# Patient Record
Sex: FEMALE | Race: WHITE | NOT HISPANIC OR LATINO
[De-identification: names, ages, dates, MRNs, and addresses within clinical notes are randomized per-mention and may not be internally consistent; named-entity substitution may affect disease eponyms.]

---

## 2021-11-15 PROBLEM — Z00.00 ENCOUNTER FOR PREVENTIVE HEALTH EXAMINATION: Status: ACTIVE | Noted: 2021-11-15

## 2021-11-16 ENCOUNTER — APPOINTMENT (OUTPATIENT)
Dept: HOME HEALTH SERVICES | Facility: HOME HEALTH | Age: 86
End: 2021-11-16
Payer: MEDICARE

## 2021-11-16 VITALS
TEMPERATURE: 98.3 F | SYSTOLIC BLOOD PRESSURE: 138 MMHG | OXYGEN SATURATION: 97 % | HEART RATE: 82 BPM | DIASTOLIC BLOOD PRESSURE: 66 MMHG | RESPIRATION RATE: 18 BRPM

## 2021-11-16 PROCEDURE — 90686 IIV4 VACC NO PRSV 0.5 ML IM: CPT

## 2021-11-16 PROCEDURE — G0008: CPT

## 2021-11-16 PROCEDURE — 99344 HOME/RES VST NEW MOD MDM 60: CPT

## 2021-11-16 PROCEDURE — 99497 ADVNCD CARE PLAN 30 MIN: CPT

## 2021-11-16 RX ORDER — OMEGA-3/DHA/EPA/FISH OIL 35-113.5MG
1000 TABLET,CHEWABLE ORAL DAILY
Qty: 30 | Refills: 4 | Status: ACTIVE | COMMUNITY
Start: 2021-11-16

## 2021-12-01 NOTE — HEALTH RISK ASSESSMENT
[HRA Reviewed] : Health risk assessment reviewed [No falls in past year] : Patient reported no falls in the past year [No] : The patient does not have visual impairment [[employer]] : n/a

## 2021-12-01 NOTE — ASSESSMENT
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

## 2021-12-01 NOTE — HISTORY OF PRESENT ILLNESS
[Family Member] : family member [FreeTextEntry1] : Gait instability [FreeTextEntry2] : Patient being seen for initial House Calls visit patient lives with 24/7 HHA.  Patient is non-verbal.  Was on medications for dementia but son states that they have not helped and is asking if she can cut back on medications.

## 2021-12-01 NOTE — PHYSICAL EXAM
[No Acute Distress] : no acute distress [No Respiratory Distress] : no respiratory distress [Clear to Auscultation] : lungs were clear to auscultation bilaterally [Normal Rate] : heart rate was normal  [Regular Rhythm] : with a regular rhythm [Normal Bowel Sounds] : normal bowel sounds [Non Tender] : non-tender [Soft] : abdomen soft [de-identified] : Stage 2 pressure ulcer to sacrum and left heel with unstageable ulcer.

## 2021-12-06 ENCOUNTER — APPOINTMENT (OUTPATIENT)
Dept: HOME HEALTH SERVICES | Facility: HOME HEALTH | Age: 86
End: 2021-12-06

## 2021-12-06 VITALS
TEMPERATURE: 97.6 F | DIASTOLIC BLOOD PRESSURE: 68 MMHG | SYSTOLIC BLOOD PRESSURE: 110 MMHG | HEART RATE: 78 BPM | RESPIRATION RATE: 18 BRPM | OXYGEN SATURATION: 94 %

## 2022-01-01 ENCOUNTER — APPOINTMENT (OUTPATIENT)
Dept: HOME HEALTH SERVICES | Facility: HOME HEALTH | Age: 87
End: 2022-01-01

## 2022-01-01 ENCOUNTER — NON-APPOINTMENT (OUTPATIENT)
Age: 87
End: 2022-01-01

## 2022-01-01 VITALS
TEMPERATURE: 98.3 F | HEART RATE: 75 BPM | DIASTOLIC BLOOD PRESSURE: 78 MMHG | SYSTOLIC BLOOD PRESSURE: 138 MMHG | RESPIRATION RATE: 16 BRPM | OXYGEN SATURATION: 95 %

## 2022-01-01 VITALS — OXYGEN SATURATION: 98 % | DIASTOLIC BLOOD PRESSURE: 60 MMHG | SYSTOLIC BLOOD PRESSURE: 117 MMHG | HEART RATE: 68 BPM

## 2022-01-01 VITALS
RESPIRATION RATE: 16 BRPM | DIASTOLIC BLOOD PRESSURE: 62 MMHG | HEART RATE: 68 BPM | SYSTOLIC BLOOD PRESSURE: 148 MMHG | TEMPERATURE: 98.5 F | OXYGEN SATURATION: 98 %

## 2022-01-01 VITALS
HEART RATE: 66 BPM | DIASTOLIC BLOOD PRESSURE: 62 MMHG | TEMPERATURE: 97.6 F | RESPIRATION RATE: 16 BRPM | OXYGEN SATURATION: 95 % | SYSTOLIC BLOOD PRESSURE: 138 MMHG

## 2022-01-01 VITALS — DIASTOLIC BLOOD PRESSURE: 60 MMHG | OXYGEN SATURATION: 98 % | SYSTOLIC BLOOD PRESSURE: 150 MMHG | HEART RATE: 86 BPM

## 2022-01-01 VITALS — HEART RATE: 80 BPM | SYSTOLIC BLOOD PRESSURE: 130 MMHG | DIASTOLIC BLOOD PRESSURE: 60 MMHG | OXYGEN SATURATION: 97 %

## 2022-01-01 DIAGNOSIS — I95.1 ORTHOSTATIC HYPOTENSION: ICD-10-CM

## 2022-01-01 PROCEDURE — 99349 HOME/RES VST EST MOD MDM 40: CPT

## 2022-01-01 PROCEDURE — G0008: CPT

## 2022-01-01 PROCEDURE — 90662 IIV NO PRSV INCREASED AG IM: CPT

## 2022-01-04 ENCOUNTER — TRANSCRIPTION ENCOUNTER (OUTPATIENT)
Age: 87
End: 2022-01-04

## 2022-01-04 ENCOUNTER — APPOINTMENT (OUTPATIENT)
Dept: HOME HEALTH SERVICES | Facility: HOME HEALTH | Age: 87
End: 2022-01-04
Payer: MEDICARE

## 2022-01-04 VITALS — DIASTOLIC BLOOD PRESSURE: 80 MMHG | HEART RATE: 74 BPM | SYSTOLIC BLOOD PRESSURE: 150 MMHG | OXYGEN SATURATION: 94 %

## 2022-01-04 DIAGNOSIS — L89.152 PRESSURE ULCER OF SACRAL REGION, STAGE 2: ICD-10-CM

## 2022-01-04 DIAGNOSIS — L89.620 PRESSURE ULCER OF LEFT HEEL, UNSTAGEABLE: ICD-10-CM

## 2022-01-04 PROCEDURE — 99349 HOME/RES VST EST MOD MDM 40: CPT

## 2022-01-04 NOTE — PHYSICAL EXAM
[No Acute Distress] : no acute distress [No Respiratory Distress] : no respiratory distress [Clear to Auscultation] : lungs were clear to auscultation bilaterally [Normal Rate] : heart rate was normal  [Regular Rhythm] : with a regular rhythm [Normal Bowel Sounds] : normal bowel sounds [Non Tender] : non-tender [Soft] : abdomen soft [No Skin Lesions] : no skin lesions

## 2022-01-04 NOTE — HISTORY OF PRESENT ILLNESS
[Family Member] : family member [FreeTextEntry1] : Gait instability [FreeTextEntry2] : Patient being seen for follow up House Calls visit patient lives with 24/7 HHA.  Patient is non-verbal.  Was on medications for dementia but son states that they have not helped and is asking if she can cut back on medications.  \par HHA states she is eating well, skin is healed.

## 2022-01-18 ENCOUNTER — APPOINTMENT (OUTPATIENT)
Dept: HOME HEALTH SERVICES | Facility: HOME HEALTH | Age: 87
End: 2022-01-18

## 2022-01-18 VITALS
RESPIRATION RATE: 16 BRPM | TEMPERATURE: 99 F | SYSTOLIC BLOOD PRESSURE: 150 MMHG | HEART RATE: 68 BPM | DIASTOLIC BLOOD PRESSURE: 68 MMHG

## 2022-03-03 ENCOUNTER — APPOINTMENT (OUTPATIENT)
Dept: HOME HEALTH SERVICES | Facility: HOME HEALTH | Age: 87
End: 2022-03-03

## 2022-03-03 VITALS
SYSTOLIC BLOOD PRESSURE: 140 MMHG | OXYGEN SATURATION: 97 % | DIASTOLIC BLOOD PRESSURE: 82 MMHG | HEART RATE: 73 BPM | TEMPERATURE: 98.3 F | RESPIRATION RATE: 16 BRPM

## 2022-03-07 ENCOUNTER — APPOINTMENT (OUTPATIENT)
Dept: HOME HEALTH SERVICES | Facility: HOME HEALTH | Age: 87
End: 2022-03-07

## 2022-04-21 ENCOUNTER — APPOINTMENT (OUTPATIENT)
Dept: HOME HEALTH SERVICES | Facility: HOME HEALTH | Age: 87
End: 2022-04-21

## 2022-04-21 VITALS
RESPIRATION RATE: 16 BRPM | SYSTOLIC BLOOD PRESSURE: 136 MMHG | DIASTOLIC BLOOD PRESSURE: 64 MMHG | OXYGEN SATURATION: 98 % | HEART RATE: 71 BPM

## 2022-05-02 RX ORDER — ESCITALOPRAM OXALATE 10 MG/1
10 TABLET ORAL DAILY
Qty: 90 | Refills: 3 | Status: ACTIVE | COMMUNITY
Start: 2021-11-16 | End: 1900-01-01

## 2022-06-28 NOTE — PHYSICAL EXAM
[No Acute Distress] : no acute distress [No Respiratory Distress] : no respiratory distress [Clear to Auscultation] : lungs were clear to auscultation bilaterally [Normal Rate] : heart rate was normal  [Regular Rhythm] : with a regular rhythm [Normal S1, S2] : normal S1 and S2 [Normal Bowel Sounds] : normal bowel sounds [Non Tender] : non-tender [Soft] : abdomen soft [de-identified] : b/l hands and upper body contracted, bedbound [de-identified] : Stage II sacral ulcer [de-identified] : non-verbal, flat affect

## 2022-06-28 NOTE — HISTORY OF PRESENT ILLNESS
[Family Member] : family member [FreeTextEntry1] : Gait instability [FreeTextEntry2] : Patient being seen for follow up House Calls visit patient lives with 24/7 HHA. HHA states that she went away and she just got back and now patient has sacral bed sore.  \par states patient is eating and drinking well, moving her bowels. \par Denies any pain

## 2022-09-01 PROBLEM — I95.1 ORTHOSTATIC HYPOTENSION: Status: ACTIVE | Noted: 2021-11-16

## 2022-09-01 NOTE — HISTORY OF PRESENT ILLNESS
[Family Member] : family member [FreeTextEntry1] : Gait instability [FreeTextEntry2] : Patient being seen for follow up House Calls visit patient lives with 24/7 HHA.\par HHA states pressure ulcer is healing well. \par states patient is eating and drinking well, moving her bowels. \par Denies any pain

## 2022-09-01 NOTE — PHYSICAL EXAM
[No Acute Distress] : no acute distress [No Respiratory Distress] : no respiratory distress [Clear to Auscultation] : lungs were clear to auscultation bilaterally [Normal Rate] : heart rate was normal  [Regular Rhythm] : with a regular rhythm [Normal S1, S2] : normal S1 and S2 [Normal Bowel Sounds] : normal bowel sounds [Non Tender] : non-tender [Soft] : abdomen soft [de-identified] : b/l hands and upper body contracted, bedbound [de-identified] : healing Stage II sacral ulcer [de-identified] : non-verbal, flat affect

## 2022-10-27 NOTE — HEALTH RISK ASSESSMENT
[Full assistance needed] : feeding [No falls in past year] : Patient reported no falls in the past year [No] : The patient does not have visual impairment [[employer]] : n/a

## 2022-10-27 NOTE — HISTORY OF PRESENT ILLNESS
[Family Member] : family member [Formal Caregiver] : formal caregiver [FreeTextEntry1] : Gait instability [FreeTextEntry2] : Patient being seen for follow up House Calls visit patient lives with 24/7 HHA.\par HHA states pressure ulcer is healing well. \par states patient is eating and drinking well, moving her bowels. \par HHA states she has a slight productive cough. \par Denies any pain\par Requesting flu shot today

## 2022-10-27 NOTE — PHYSICAL EXAM
[No Acute Distress] : no acute distress [No Respiratory Distress] : no respiratory distress [Clear to Auscultation] : lungs were clear to auscultation bilaterally [Normal Rate] : heart rate was normal  [Regular Rhythm] : with a regular rhythm [Normal S1, S2] : normal S1 and S2 [Normal Bowel Sounds] : normal bowel sounds [Non Tender] : non-tender [Soft] : abdomen soft [de-identified] : b/l hands and upper body contracted, bedbound [de-identified] : healing Stage II sacral ulcer [de-identified] : non-verbal, flat affect

## 2023-01-01 ENCOUNTER — APPOINTMENT (OUTPATIENT)
Dept: HOME HEALTH SERVICES | Facility: HOME HEALTH | Age: 88
End: 2023-01-01
Payer: MEDICARE

## 2023-01-01 ENCOUNTER — RX RENEWAL (OUTPATIENT)
Age: 88
End: 2023-01-01

## 2023-01-01 ENCOUNTER — NON-APPOINTMENT (OUTPATIENT)
Age: 88
End: 2023-01-01

## 2023-01-01 ENCOUNTER — TRANSCRIPTION ENCOUNTER (OUTPATIENT)
Age: 88
End: 2023-01-01

## 2023-01-01 ENCOUNTER — APPOINTMENT (OUTPATIENT)
Dept: HOME HEALTH SERVICES | Facility: HOME HEALTH | Age: 88
End: 2023-01-01

## 2023-01-01 VITALS
TEMPERATURE: 98.1 F | RESPIRATION RATE: 16 BRPM | SYSTOLIC BLOOD PRESSURE: 142 MMHG | DIASTOLIC BLOOD PRESSURE: 62 MMHG | OXYGEN SATURATION: 95 % | HEART RATE: 73 BPM

## 2023-01-01 VITALS — OXYGEN SATURATION: 98 % | SYSTOLIC BLOOD PRESSURE: 140 MMHG | HEART RATE: 99 BPM | DIASTOLIC BLOOD PRESSURE: 60 MMHG

## 2023-01-01 DIAGNOSIS — F32.A DEPRESSION, UNSPECIFIED: ICD-10-CM

## 2023-01-01 DIAGNOSIS — L89.210 PRESSURE ULCER OF RIGHT HIP, UNSTAGEABLE: ICD-10-CM

## 2023-01-01 DIAGNOSIS — L89.152 PRESSURE ULCER OF SACRAL REGION, STAGE 2: ICD-10-CM

## 2023-01-01 DIAGNOSIS — E78.5 HYPERLIPIDEMIA, UNSPECIFIED: ICD-10-CM

## 2023-01-01 DIAGNOSIS — B36.9 SUPERFICIAL MYCOSIS, UNSPECIFIED: ICD-10-CM

## 2023-01-01 DIAGNOSIS — L89.154 PRESSURE ULCER OF SACRAL REGION, STAGE 4: ICD-10-CM

## 2023-01-01 DIAGNOSIS — Z23 ENCOUNTER FOR IMMUNIZATION: ICD-10-CM

## 2023-01-01 DIAGNOSIS — R64 CACHEXIA: ICD-10-CM

## 2023-01-01 DIAGNOSIS — F03.C0 UNSPECIFIED DEMENTIA, SEVERE, WITHOUT BEHAVIORAL DISTURBANCE, PSYCHOTIC DISTURBANCE, MOOD DISTURBANCE, AND ANXIETY: ICD-10-CM

## 2023-01-01 PROCEDURE — 99350 HOME/RES VST EST HIGH MDM 60: CPT

## 2023-01-01 PROCEDURE — 99349 HOME/RES VST EST MOD MDM 40: CPT

## 2023-01-01 RX ORDER — FLUDROCORTISONE ACETATE 0.1 MG/1
0.1 TABLET ORAL DAILY
Qty: 45 | Refills: 3 | Status: ACTIVE | COMMUNITY
Start: 2021-11-16 | End: 1900-01-01

## 2023-01-01 RX ORDER — CEPHALEXIN 500 MG/1
500 CAPSULE ORAL 4 TIMES DAILY
Qty: 40 | Refills: 0 | Status: ACTIVE | COMMUNITY
Start: 2023-01-01 | End: 1900-01-01

## 2023-01-01 RX ORDER — ATORVASTATIN CALCIUM 10 MG/1
10 TABLET, FILM COATED ORAL
Qty: 90 | Refills: 2 | Status: ACTIVE | COMMUNITY
Start: 2021-11-16 | End: 1900-01-01

## 2023-01-01 RX ORDER — NYSTATIN 100000 1/G
100000 POWDER TOPICAL 3 TIMES DAILY
Qty: 1 | Refills: 2 | Status: ACTIVE | COMMUNITY
Start: 2023-01-01 | End: 1900-01-01

## 2023-01-01 RX ORDER — COLLAGENASE SANTYL 250 [ARB'U]/G
250 OINTMENT TOPICAL DAILY
Qty: 1 | Refills: 2 | Status: ACTIVE | COMMUNITY
Start: 2023-01-01 | End: 1900-01-01

## 2023-01-09 PROBLEM — F32.A DEPRESSION: Status: ACTIVE | Noted: 2021-11-16

## 2023-01-09 PROBLEM — Z23 ENCOUNTER FOR IMMUNIZATION: Status: RESOLVED | Noted: 2021-11-16 | Resolved: 2023-01-01

## 2023-01-09 PROBLEM — E78.5 HLD (HYPERLIPIDEMIA): Status: ACTIVE | Noted: 2021-11-16

## 2023-01-09 PROBLEM — L89.152 PRESSURE INJURY OF SACRAL REGION, STAGE 2: Status: RESOLVED | Noted: 2022-01-01 | Resolved: 2023-01-01

## 2023-01-09 NOTE — PHYSICAL EXAM
[No Acute Distress] : no acute distress [No Respiratory Distress] : no respiratory distress [Clear to Auscultation] : lungs were clear to auscultation bilaterally [Normal Rate] : heart rate was normal  [Regular Rhythm] : with a regular rhythm [Normal S1, S2] : normal S1 and S2 [Normal Bowel Sounds] : normal bowel sounds [Non Tender] : non-tender [Soft] : abdomen soft [de-identified] : b/l hands and upper body contracted, bedbound [de-identified] : healing Stage II sacral ulcer [de-identified] : non-verbal, flat affect

## 2023-01-09 NOTE — HISTORY OF PRESENT ILLNESS
[Family Member] : family member [Formal Caregiver] : formal caregiver [FreeTextEntry1] : Gait instability [FreeTextEntry2] : Patient being seen for follow up House Calls visit patient lives with 24/7 HHA.\par HHA states pressure ulcer is healing well. \par states patient is eating and drinking well, moving her bowels. \par Denies any pain\par

## 2023-02-11 PROBLEM — B36.9 FUNGAL DERMATITIS: Status: ACTIVE | Noted: 2023-01-01

## 2023-03-07 PROBLEM — L89.154 PRESSURE INJURY OF SACRAL REGION, STAGE 4: Status: ACTIVE | Noted: 2023-01-01

## 2023-03-07 PROBLEM — L89.210 PRESSURE INJURY OF RIGHT HIP, UNSTAGEABLE: Status: ACTIVE | Noted: 2023-01-01

## 2023-03-07 PROBLEM — R64 CACHECTIC: Status: ACTIVE | Noted: 2023-01-01

## 2023-03-07 NOTE — HISTORY OF PRESENT ILLNESS
[Family Member] : family member [Formal Caregiver] : formal caregiver [FreeTextEntry1] : Bed bound [FreeTextEntry2] : Son called and stated that patient has new pressure ulcers.  HHA states she has been trying to manage but not improving.  Has wound to Rt hip and sacrum.

## 2023-03-07 NOTE — PHYSICAL EXAM
[No Acute Distress] : no acute distress [No Respiratory Distress] : no respiratory distress [de-identified] : temporal wasting [de-identified] : unstageable pressure ulcers to Rt hip and sacrum.

## 2023-03-07 NOTE — ASSESSMENT
[FreeTextEntry1] : Discussed with son, will make home care referral to try and help with wounds but given advanced dementia and malnutrition unlikley to get wounds to heal.  Will consider hospice.

## 2023-04-05 PROBLEM — F03.C0 ADVANCED DEMENTIA: Status: ACTIVE | Noted: 2021-11-16
